# Patient Record
Sex: FEMALE | Race: WHITE | Employment: OTHER | URBAN - METROPOLITAN AREA
[De-identification: names, ages, dates, MRNs, and addresses within clinical notes are randomized per-mention and may not be internally consistent; named-entity substitution may affect disease eponyms.]

---

## 2018-04-11 PROCEDURE — 86803 HEPATITIS C AB TEST: CPT | Performed by: INTERNAL MEDICINE

## 2018-05-09 PROBLEM — E11.00 TYPE 2 DIABETES MELLITUS WITH HYPEROSMOLARITY WITHOUT COMA, WITHOUT LONG-TERM CURRENT USE OF INSULIN (HCC): Status: ACTIVE | Noted: 2018-05-09

## 2021-01-13 PROBLEM — E11.22 TYPE 2 DIABETES MELLITUS WITH STAGE 3A CHRONIC KIDNEY DISEASE, WITHOUT LONG-TERM CURRENT USE OF INSULIN (HCC): Status: ACTIVE | Noted: 2021-01-13

## 2021-01-13 PROBLEM — N18.31 TYPE 2 DIABETES MELLITUS WITH STAGE 3A CHRONIC KIDNEY DISEASE, WITHOUT LONG-TERM CURRENT USE OF INSULIN (HCC): Status: ACTIVE | Noted: 2021-01-13

## 2021-01-13 PROBLEM — E66.9 OBESITY (BMI 30-39.9): Status: ACTIVE | Noted: 2021-01-13

## 2021-01-13 PROBLEM — F32.5 MAJOR DEPRESSIVE DISORDER WITH SINGLE EPISODE, IN REMISSION (HCC): Status: ACTIVE | Noted: 2021-01-13

## 2021-06-15 PROBLEM — I70.0 AORTIC ATHEROSCLEROSIS (HCC): Status: ACTIVE | Noted: 2021-06-15

## 2021-06-15 PROBLEM — K74.60 CIRRHOSIS OF LIVER (HCC): Status: ACTIVE | Noted: 2021-06-15

## 2021-06-15 PROBLEM — E27.8 ADRENAL NODULE (HCC): Status: ACTIVE | Noted: 2021-06-15

## 2021-06-16 PROBLEM — Z85.3 HISTORY OF RIGHT BREAST CANCER: Status: ACTIVE | Noted: 2021-06-16

## 2021-11-19 ENCOUNTER — APPOINTMENT (OUTPATIENT)
Dept: CT IMAGING | Facility: HOSPITAL | Age: 66
End: 2021-11-19
Attending: EMERGENCY MEDICINE
Payer: MEDICARE

## 2021-11-19 ENCOUNTER — HOSPITAL ENCOUNTER (EMERGENCY)
Facility: HOSPITAL | Age: 66
Discharge: HOME | End: 2021-11-19
Attending: EMERGENCY MEDICINE
Payer: MEDICARE

## 2021-11-19 VITALS
HEIGHT: 69 IN | TEMPERATURE: 98 F | RESPIRATION RATE: 20 BRPM | DIASTOLIC BLOOD PRESSURE: 95 MMHG | WEIGHT: 230 LBS | OXYGEN SATURATION: 98 % | BODY MASS INDEX: 34.07 KG/M2 | HEART RATE: 101 BPM | SYSTOLIC BLOOD PRESSURE: 160 MMHG

## 2021-11-19 DIAGNOSIS — I48.91 ATRIAL FIBRILLATION WITH CONTROLLED VENTRICULAR RESPONSE (HCC): Primary | ICD-10-CM

## 2021-11-19 PROCEDURE — 83735 ASSAY OF MAGNESIUM: CPT | Performed by: EMERGENCY MEDICINE

## 2021-11-19 PROCEDURE — 82962 GLUCOSE BLOOD TEST: CPT

## 2021-11-19 PROCEDURE — 93005 ELECTROCARDIOGRAM TRACING: CPT

## 2021-11-19 PROCEDURE — 80053 COMPREHEN METABOLIC PANEL: CPT | Performed by: EMERGENCY MEDICINE

## 2021-11-19 PROCEDURE — 84484 ASSAY OF TROPONIN QUANT: CPT | Performed by: EMERGENCY MEDICINE

## 2021-11-19 PROCEDURE — 99284 EMERGENCY DEPT VISIT MOD MDM: CPT

## 2021-11-19 PROCEDURE — 85610 PROTHROMBIN TIME: CPT | Performed by: EMERGENCY MEDICINE

## 2021-11-19 PROCEDURE — 85730 THROMBOPLASTIN TIME PARTIAL: CPT | Performed by: EMERGENCY MEDICINE

## 2021-11-19 PROCEDURE — 71260 CT THORAX DX C+: CPT | Performed by: EMERGENCY MEDICINE

## 2021-11-19 PROCEDURE — 84443 ASSAY THYROID STIM HORMONE: CPT | Performed by: EMERGENCY MEDICINE

## 2021-11-19 PROCEDURE — 85025 COMPLETE CBC W/AUTO DIFF WBC: CPT | Performed by: EMERGENCY MEDICINE

## 2021-11-19 PROCEDURE — 93010 ELECTROCARDIOGRAM REPORT: CPT

## 2021-11-19 PROCEDURE — 85379 FIBRIN DEGRADATION QUANT: CPT | Performed by: EMERGENCY MEDICINE

## 2021-11-19 PROCEDURE — 36415 COLL VENOUS BLD VENIPUNCTURE: CPT

## 2021-11-19 PROCEDURE — 99285 EMERGENCY DEPT VISIT HI MDM: CPT

## 2021-11-19 RX ORDER — DILTIAZEM HYDROCHLORIDE 120 MG/1
120 CAPSULE, COATED, EXTENDED RELEASE ORAL DAILY
Qty: 30 CAPSULE | Refills: 2 | Status: SHIPPED | OUTPATIENT
Start: 2021-11-19 | End: 2021-12-19

## 2021-11-19 NOTE — ED PROVIDER NOTES
Patient Seen in: [facility] Emergency Department      History   Patient presents with:  Arrythmia/Palpitations    Stated Complaint: sent by cardiologist for tachycardia, 115 HR at TL, no complaints    Subjective:   HPI    Patient is a 58-year-old fe Alcohol/week: 0.0 standard drinks      Comment: rarely    Drug use: No             Review of Systems    Positive for stated complaint: sent by cardiologist for tachycardia, 115 HR at TL, no complaints  Other systems are as noted in HPI.   Constitutional an normal limits   PTT, ACTIVATED - Abnormal; Notable for the following components:    PTT 24.8 (*)     All other components within normal limits   TROPONIN I - Normal   PROTHROMBIN TIME (PT) - Normal   MAGNESIUM - Normal   TSH W REFLEX TO FREE T4 - Normal or attenuation. PAVEL:  No mass or adenopathy. MEDIASTINUM:  No mass or adenopathy. Scattered small mediastinal lymph nodes measure less than 10 mm in short axis. Bilateral thyroid nodules measuring greater than 2 cm.  CARDIAC:  No enlargement, pericardi controlled ventricular response ([facility] [address]  (primary encounter diagnosis)     Disposition:  Discharge  11/19/2021  4:20 pm    Follow-up:  Tyrus Apley, MD  [address]          Kyaw Coon MD  [address]

## 2021-11-19 NOTE — CM/SW NOTE
CM spoke with Courtland Ped in [facility] regarding eliquis. Free 30 day Eliquis trial pack will be delivered to bedside. Script sent via tube station.     Johnnie Souza, MSN RN, [address]  X [phone]

## 2021-11-19 NOTE — ED INITIAL ASSESSMENT (HPI)
Patient to ED for a-fib RVR found during pre-op EKG. Patient scheduled to have hernia repair next week. Patient denies shortness of breath. Denies chest pain. Denies palpitations.